# Patient Record
(demographics unavailable — no encounter records)

---

## 2018-01-03 NOTE — PCM.OPNOTE
- General Post-Op/Procedure Note


Date of Surgery/Procedure: 01/03/18


Operative Procedure(s): L knee arthroscopy with PMM/PLM


Post-Op Diagnosis: L knee DJD, med/lat meniscus tear


Anesthesia Technique: General LMA


Primary Surgeon: Shefali Bravo


Assistant: Ivory White in mLs: 5


Condition: Good


Free Text/Narrative:: 





tt=13 min


#[id number]

## 2018-01-03 NOTE — OR
SURGEON:

Shefali Bravo MD

 

DATE OF PROCEDURE:  01/03/2018

 

PREOPERATIVE DIAGNOSES:

1. Degenerative joint disease, left knee.

2. Left knee lateral meniscus tear.

 

POSTOPERATIVE DIAGNOSIS:

1. Degenerative joint disease, left knee.

2. Left knee lateral meniscus tear.

 

PROCEDURE:

Left knee arthroscopy with partial medial and lateral meniscectomies.

 

ASSISTANT:

Dahlia Calles RN.

 

ANESTHESIA:

General.

 

ESTIMATED BLOOD LOSS:

5 mL.

 

TOURNIQUET TIME:

13 minutes.

 

COMPLICATIONS:

None.

 

DVT PROPHYLAXIS:

Not indicated.

 

IMPLANTS USED:

None.

 

BRIEF HISTORY:

Daniel is a 69-year-old male who has had complaint of progressive left knee

pain.  An MRI did show a tear of the lateral meniscus.  Due to his lack of

response to conservative treatment, I did recommend surgical intervention.

Risks and goals of the procedure were discussed with the patient and were

documented preoperatively.  He agreed to proceed.

 

DESCRIPTION OF PROCEDURE:

The patient was properly identified and brought to the operating room.  He was

transferred from the OR cart and placed on the operating table in supine

position.  General anesthesia was administered.  After adequate anesthesia was

obtained, a well-padded tourniquet was applied to the left lower extremity.  The

left lower extremity was then prepped in standard fashion using ChloraPrep

solution.  It was then sterilely draped.  A time-out was performed to ensure

correct site and procedure.  Preoperative antibiotics were given.  The surgical

site had been marked preoperatively.  An Esmarch was used to exsanguinate the

left lower extremity and the tourniquet was inflated to 250 mmHg.

 

A lateral portal arthrotomy was established.  Blunt trocar and cannula were

introduced into the suprapatellar pouch.  Camera, inflow, and outflow were

assembled.  The suprapatellar pouch showed no significant synovitis.  The

patellofemoral joint was then visualized.  The lower portion of the patella

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  The trochlear groove

showed minor grade 2 chondromalacia only.  The patella appeared to track

centrally.  I then extended down the lateral and medial gutter.  No loose bodies

were identified.

 

I then entered the medial compartment.  A medial portal arthrotomy was

established.  A blunt probe was inserted.  He was found to have a radial tear

along the posterior horn of the medial meniscus.  Using a combination of biters

and shaver, this was resected back to a stable remnant.  Grade 3 chondromalacia

was also noted diffusely along the undersurface of the medial femoral condyle.

A chondroplasty was performed and this was resected to a stable remnant.  Grade

2 chondromalacia was noted along the medial tibial plateau.

 

I then entered the notch.  Both the ACL and PCL were visualized and probed and

found to be intact.

 

I then entered the lateral compartment.  Grade 3 degenerative changes were again

noted along the lateral tibial plateau.  The lateral femoral condyle showed

diffuse grade 2 chondromalacia changes only.  He did have degenerative fraying

with some horizontal tearing of the lateral meniscus.  This was resected with a

combination of biters and shaver.  The meniscus was again probed and found to be

stable.

 

Instruments were then removed from the knee.  The portal sites were closed with

3-0 nylon.  Lidocaine 1% was injected along the portal tracts.  Xeroform gauze

was placed over the wound and a bulky dressing was applied.  The tourniquet was

then deflated.  He was awakened from his anesthetic and transferred back to the

operating room cart.  He was brought to recovery room in stable condition.  All

needle and sponge counts were correct.

 

 

VLADIMIR / ALYSSA

DD:  01/03/2018 10:31:44

DT:  01/03/2018 11:00:26

Job #:  [id number]

## 2018-01-03 NOTE — PCM.PREANE
Preanesthetic Assessment





- Anesthesia/Transfusion/Family Hx


Anesthesia History: Prior Anesthesia Without Reaction


Family History of Anesthesia Reaction: No


Transfusion History: No Prior Transfusion(s)





- Review of Systems


General: No Symptoms


Pulmonary: No Symptoms


Cardiovascular: No Symptoms


Gastrointestinal: No Symptoms


Neurological: No Symptoms


Other: Reports: None





- Physical Assessment


NPO Status Date: 01/02/18


Height: 1.83 m


Weight: 117.027 kg


ASA Class: 3


Mental Status: Alert & Oriented x3


Airway Class: Mallampati = 2


Dentition: Reports: Normal Dentition


ROM/Head Extension: Full


Lungs: Clear to Auscultation, Normal Respiratory Effort


Cardiovascular: Regular Rate, Regular Rhythm





- Allergies


Allergies/Adverse Reactions: 


 Allergies











Allergy/AdvReac Type Severity Reaction Status Date / Time


 


No Known Allergies Allergy   Verified 12/28/17 10:52














- Anesthesia Plan


Pre-Op Medication Ordered: None





- Acknowledgements


Anesthesia Type Planned: General Anesthesia


Pt an Appropriate Candidate for the Planned Anesthesia: Yes


Alternatives and Risks of Anesthesia Discussed w Pt/Guardian: Yes


Pt/Guardian Understands and Agrees with Anesthesia Plan: Yes


Additional Comments: 





PMH: DM 1.5 (on insulin), EVA uses CPAP, htn, hld, s/p radha, gout, PVOD/PAD s/

p femoral stent





Patient would prefer head of bed to be elevated 30% after surgery.





PreAnesthesia Questionnaire


Other HEENT History: wears glasses, has 2 upper dental implants


Cardiovascular History: Reports: High Cholesterol, Hypertension


Respiratory History: Reports: Sleep Apnea


Other Respiratory History: uses CPAP


Gastrointestinal History: Reports: Hiatal Hernia, Other (See Below)


Other Gastrointestinal History: has Barretts Esophagus


Musculoskeletal History: Reports: Gout


Endocrine/Metabolic History: Reports: Diabetes, Type II, Obesity/BMI 30+





- Past Surgical History


Head Surgeries/Procedures: Reports: None


HEENT Surgical History: Reports: Oral Surgery


Other HEENT Surgeries/Procedures: 2 dental implants


Cardiovascular Surgical History: Reports: Vascular Surgery


Other Cardiovascular Surgeries/Procedures: right femoral artery stent placement


GI Surgical History: Reports: Colonoscopy, EGD, Nissen Fundoplication


Musculoskeletal Surgical History: Reports: Arthroscopic Knee, Shoulder Surgery


Other Musculoskeletal Surgeries/Procedures:: left RTCR, denies hardware





- SUBSTANCE USE


Smoking Status *Q: Former Smoker


Tobacco Use Within Last Twelve Months: No


Recreational Drug Use History: No





- HOME MEDS


Home Medications: 


 Home Meds





Allopurinol [Zyloprim] 300 mg PO DAILY 12/28/17 [History]


Aspirin [Adult Low Dose Aspirin EC] 81 mg PO DAILY 12/28/17 [History]


Bisoprolol [Zebeta] 5 mg PO DAILY 12/28/17 [History]


Hydrochlorothiazide 25 mg PO DAILY 12/28/17 [History]


Insulin Aspart [NovoLOG] 8 unit SQ TIDMEALS 12/28/17 [History]


Insulin Glarg,Human.Rec.Analog [LantUS Solostar] 20 unit SQ QPM 12/28/17 [

History]


Omeprazole 20 mg PO DAILY 12/28/17 [History]


Pioglitazone HCl [Actos] 30 mg PO DAILY 12/28/17 [History]


Rosuvastatin [Crestor] 10 mg PO DAILY 12/28/17 [History]


amLODIPine [Norvasc] 5 mg PO DAILY 12/28/17 [History]











- CURRENT (IN HOUSE) MEDS


Current Meds: 





 Current Medications





Hydrocodone Bitart/Acetaminophen (Norco 325-5 Mg)  1 - 2 tab PO Q4H PRN


   PRN Reason: Pain


Cefazolin Sodium/Dextrose 2 gm (/ Premix)  50 mls @ 100 mls/hr IV ONCALL [facility]


Lactated Ringer's (Ringers, Lactated)  1,000 mls @ 100 mls/hr IV ASDIRECTED [facility]

## 2018-01-03 NOTE — PCM.POSTAN
POST ANESTHESIA ASSESSMENT





- MENTAL STATUS


Mental Status: Alert, Oriented





- RESPIRATORY


Respiratory Status: Respiratory Rate WNL, Airway Patent, O2 Saturation Stable





- CARDIOVASCULAR


CV Status: Pulse Rate WNL, Blood Pressure Stable





- GASTROINTESTINAL


GI Status: No Symptoms





- PAIN


Pain Score: 2





- POST OP HYDRATION


Hydration Status: Adequate & Stable